# Patient Record
Sex: MALE | Race: WHITE
[De-identification: names, ages, dates, MRNs, and addresses within clinical notes are randomized per-mention and may not be internally consistent; named-entity substitution may affect disease eponyms.]

---

## 2019-04-13 ENCOUNTER — HOSPITAL ENCOUNTER (EMERGENCY)
Dept: HOSPITAL 74 - JER | Age: 19
LOS: 1 days | Discharge: HOME | End: 2019-04-14
Payer: COMMERCIAL

## 2019-04-13 VITALS — SYSTOLIC BLOOD PRESSURE: 132 MMHG | HEART RATE: 81 BPM | TEMPERATURE: 98.3 F | DIASTOLIC BLOOD PRESSURE: 54 MMHG

## 2019-04-13 VITALS — BODY MASS INDEX: 27.2 KG/M2

## 2019-04-13 DIAGNOSIS — S20.352A: ICD-10-CM
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
## 2019-04-13 DIAGNOSIS — S20.362A: Primary | ICD-10-CM
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
## 2019-04-13 PROCEDURE — 0HC5XZZ EXTIRPATION OF MATTER FROM CHEST SKIN, EXTERNAL APPROACH: ICD-10-PCS | Performed by: EMERGENCY MEDICINE

## 2019-04-13 NOTE — PDOC
Attending Attestation





- HPI


HPI: 





04/13/19 23:38


The patient is a 18 year old male with no past medical history here today for 

evaluation of suspected tick bite to the chest. The patient reports that he 

noticed a tick in his left chest 30 minutes prior to arrival to the ED. He 

reports trying to take out the tick with tweezers and that the head broke off 

in his chest. 


 


Patient denies headache, lightheadedness. Denies fever, chills. Denies chest 

pain, shortness of breath. Denies nausea, vomiting, diarrhea, abdominal pain. 





Allergies: lactose








<Buck Nicole - Last Filed: 04/13/19 23:38>





- Physicial Exam


PE: 





04/14/19 01:51


Agree with resident exam.  





- Medical Decision Making





04/14/19 01:51


Pt advised regarding target lesions or cellulitis.


Pt will be given one dose of doxy here, as he was bitten by a tick up in [place].


If cellulitis develops, pt should be treated with a course of doxy x 1 week.





<Ju Herring - Last Filed: 04/14/19 01:52>





Attestations





- Attestations





04/13/19 23:39





Documentation prepared by EDUARDO Sher, acting as medical scribe for 

Ju Herring MD.





<Buck Nicole - Last Filed: 04/13/19 23:38>

## 2019-04-13 NOTE — PDOC
History of Present Illness





- General


Chief Complaint: Bite


Stated Complaint: INSECT BITE


Time Seen by Provider: 04/13/19 22:51





- History of Present Illness


Initial Comments: 





04/13/19 22:53


17 yo M with no significant pmh who p/w bug bite to left chest. Patient reports 

noticing what he believs was a tick attached to left lateral chest wall 

approximately 40 minutes PTA. Attempted removal with tweezers, but states that 

the head of the bug became impacted in skin. No complaints. 





Patient denies HA, vision change, palpitations, cough, wheezing, orthopena, PND

, leg swelling/pain, N/V, F,C, CP, SOB, urinary complaints, hematuria, BPR, 

abdominal pain, diarrhea, constipation, lightheadedness, weakness, sensory 

changes.





PMHx: as noted above


ROS: as noted


Allergies: NKDA








Past History





- Past Medical History


Allergies/Adverse Reactions: 


 Allergies











Allergy/AdvReac Type Severity Reaction Status Date / Time


 


lactose Allergy   Verified 04/13/19 22:41











Home Medications: 


Ambulatory Orders





NK [No Known Home Medication]  04/13/19 








COPD: No





- Immunization History


Immunization Up to Date: Yes





- Suicide/Smoking/Psychosocial Hx


Smoking History: Never smoked


Hx Alcohol Use: No


Drug/Substance Use Hx: No





**Review of Systems





- Review of Systems


Comments:: 





04/13/19 22:54





GENERAL/CONSTITUTIONAL: No fever or chills. No weakness.


HEAD, EYES, EARS, NOSE AND THROAT: No change in vision. No ear pain or 

discharge. No sore throat.


CARDIOVASCULAR: No chest pain or shortness of breath


RESPIRATORY: No cough, wheezing, or hemoptysis.


GASTROINTESTINAL: No nausea, vomiting, diarrhea or constipation.


GENITOURINARY: No dysuria, frequency, or change in urination.


MUSCULOSKELETAL: No joint or muscle swelling or pain. No neck or back pain.


SKIN: No rash


NEUROLOGIC: No headache, vertigo, loss of consciousness, or change in strength/

sensation.


ENDOCRINE: No increased thirst. No abnormal weight change


HEMATOLOGIC/LYMPHATIC: No anemia, easy bleeding, or history of blood clots.


ALLERGIC/IMMUNOLOGIC: No hives or skin allergy.











*Physical Exam





- Vital Signs


 Last Vital Signs











Temp Pulse Resp BP Pulse Ox


 


 98.3 F   81   14   132/54 L  96 


 


 04/13/19 22:39  04/13/19 22:39  04/13/19 22:39  04/13/19 22:39  04/13/19 22:39














- Physical Exam


Comments: 





04/13/19 22:54


GENERAL: Awake, alert, and fully oriented, in no acute distress


HEAD: No signs of trauma, normocephalic, atraumatic 


EYES: PERRLA, EOMI, sclera anicteric, conjunctiva clear


ENT: Auricles normal inspection, hearing grossly normal, nares patent, 

oropharynx clear without


exudates. Moist mucosa


NECK: Normal ROM, supple, no lymphadenopathy, JVD, or masses


LUNGS: No distress, speaks full sentences, clear to auscultation bilaterally 


HEART: Regular rate and rhythm, normal S1 and S2, no murmurs, rubs or gallops, 

peripheral pulses normal and equal bilaterally. 


ABDOMEN: Soft, nontender, normoactive bowel sounds.  No guarding, no rebound.  

No masses


EXTREMITIES : Normal inspection, Normal range of motion, no edema.  No clubbing 

or cyanosis. 


NEUROLOGICAL: Cranial nerves II through XII grossly intact.  Normal speech, 

normal gait, no focal sensorimotor deficits 


SKIN: +1 mm incision with retained insect part in left inframmamary, lateral, 

chest wall. slight marginal ertyhema, with absent fluctuance, discharge/

drainage. Warm, Dry, normal turgor, no rashes noted. 








Medical Decision Making





- Medical Decision Making





04/13/19 23:22


17 yo M with no significant pmh who p/w bug bite to left chest. Vitals wnl, Af, 

A&Ox3. Physical exam notable for impacted head of insect in 1 mm incision in 

left inframmamary, lateral, chest wall, with retained insect. slight marginal 

ertyhema, with absent fluctuance, discharge/drainage. No evidence of systemic 

symptoms, tick born illness. Denies Rash, arthralgias, HA, vision change, 

palpitations, N/V, F,C, CP, SOB, urinary complaints, hematuria, BPR, abdominal 

pain, diarrhea, constipation, lightheadedness, weakness, sensory changes.








ED course: 


Performed local excision of retained insect from skin





04/13/19 23:26


Counseled patient on monitoring for tick bourne disease. 


04/13/19 23:33





Doxycycline 100 mg


04/14/19 00:05


Patient stable for d/c with return precautions. 





*DC/Admit/Observation/Transfer


Diagnosis at time of Disposition: 


Tick bite


Qualifiers:


 Encounter type: initial encounter Qualified Code(s): W57.XXXA - Bitten or 

stung by nonvenomous insect and other nonvenomous arthropods, initial encounter








- Discharge Dispostion


Condition at time of disposition: Stable





- Referrals





- Patient Instructions


Printed Discharge Instructions:  [facility] for Insect Bites and Stings


Additional Instructions: 


Please return to the emergency department with any new or worsening symptoms or 

concerns. Please follow up with your primary care physician within 72 hours.








- Post Discharge Activity